# Patient Record
Sex: MALE
[De-identification: names, ages, dates, MRNs, and addresses within clinical notes are randomized per-mention and may not be internally consistent; named-entity substitution may affect disease eponyms.]

---

## 2020-08-21 ENCOUNTER — HOSPITAL ENCOUNTER (EMERGENCY)
Dept: HOSPITAL 97 - ER | Age: 30
Discharge: HOME | End: 2020-08-21
Payer: SELF-PAY

## 2020-08-21 DIAGNOSIS — F41.9: Primary | ICD-10-CM

## 2020-08-21 DIAGNOSIS — Z88.5: ICD-10-CM

## 2020-08-21 LAB
ALBUMIN SERPL BCP-MCNC: 4.2 G/DL (ref 3.4–5)
ALP SERPL-CCNC: 58 U/L (ref 45–117)
ALT SERPL W P-5'-P-CCNC: 53 U/L (ref 12–78)
AST SERPL W P-5'-P-CCNC: 22 U/L (ref 15–37)
BUN BLD-MCNC: 13 MG/DL (ref 7–18)
GLUCOSE SERPLBLD-MCNC: 111 MG/DL (ref 74–106)
HCT VFR BLD CALC: 42.3 % (ref 39.6–49)
LIPASE SERPL-CCNC: 73 U/L (ref 73–393)
LYMPHOCYTES # SPEC AUTO: 2.3 K/UL (ref 0.7–4.9)
MAGNESIUM SERPL-MCNC: 2.1 MG/DL (ref 1.8–2.4)
METHAMPHET UR QL SCN: NEGATIVE
PMV BLD: 8.1 FL (ref 7.6–11.3)
POTASSIUM SERPL-SCNC: 3.6 MMOL/L (ref 3.5–5.1)
RBC # BLD: 4.8 M/UL (ref 4.33–5.43)
THC SERPL-MCNC: NEGATIVE NG/ML
TROPONIN (EMERG DEPT USE ONLY): < 0.02 NG/ML (ref 0–0.04)

## 2020-08-21 PROCEDURE — 80076 HEPATIC FUNCTION PANEL: CPT

## 2020-08-21 PROCEDURE — 83735 ASSAY OF MAGNESIUM: CPT

## 2020-08-21 PROCEDURE — 81003 URINALYSIS AUTO W/O SCOPE: CPT

## 2020-08-21 PROCEDURE — 83690 ASSAY OF LIPASE: CPT

## 2020-08-21 PROCEDURE — 85025 COMPLETE CBC W/AUTO DIFF WBC: CPT

## 2020-08-21 PROCEDURE — 80307 DRUG TEST PRSMV CHEM ANLYZR: CPT

## 2020-08-21 PROCEDURE — 36415 COLL VENOUS BLD VENIPUNCTURE: CPT

## 2020-08-21 PROCEDURE — 84484 ASSAY OF TROPONIN QUANT: CPT

## 2020-08-21 PROCEDURE — 99284 EMERGENCY DEPT VISIT MOD MDM: CPT

## 2020-08-21 PROCEDURE — 80048 BASIC METABOLIC PNL TOTAL CA: CPT

## 2020-08-21 PROCEDURE — 71045 X-RAY EXAM CHEST 1 VIEW: CPT

## 2020-08-21 PROCEDURE — 93005 ELECTROCARDIOGRAM TRACING: CPT

## 2020-08-21 NOTE — ER
Nurse's Notes                                                                                     

 [facility]                                                                 

Name: Brandon Chaudhari                                                                              

Age: 29 yrs                                                                                       

Sex: Male                                                                                         

: 1990                                                                                   

MRN: [id number]                                                                                   

Arrival Date: 2020                                                                          

Time: 20:31                                                                                       

Account#: [id number]                                                                            

Bed 5                                                                                             

Private MD:                                                                                       

Diagnosis: Chest pain, unspecified;Anxiety                                                        

                                                                                                  

Presentation:                                                                                     

                                                                                             

20:44 Chief complaint: Patient states: "I have had anxiety in the past and it might be that,  jd3 

      but this is twice in one day now. I am having numbness in both arms, chest pressure and     

      a knot in my throat that makes it hard to breath. it happened earlier and they even         

      called the ambulance, but I got better before it arrived.". Coronavirus screen: At this     

      time, the client does not indicate any symptoms associated with coronavirus-19. Ebola       

      Screen: Patient negative for fever greater than or equal to 101.5 degrees Fahrenheit,       

      and additional compatible Ebola Virus Disease symptoms. Initial Sepsis Screen: Does the     

      patient meet any 2 criteria? No. Patient's initial sepsis screen is negative. Does the      

      patient have a suspected source of infection? No. Patient's initial sepsis screen is        

      negative. Risk Assessment: Do you want to hurt yourself or someone else? Patient            

      reports no desire to harm self or others. Onset of symptoms was 2020.            

20:44 Method Of Arrival: Ambulatory                                                           jd3 

20:44 Acuity: MARTHA 3                                                                           jd3 

                                                                                                  

Historical:                                                                                       

- Allergies:                                                                                      

20:49 Morphine;                                                                               jd3 

- Home Meds:                                                                                      

20:49 None [Active];                                                                          jd3 

- PMHx:                                                                                           

20:49 Anxiety; PVC's; ADD/ADHD;                                                               jd3 

- PSHx:                                                                                           

20:49 FELICE hand; left ankle;                                                                   jd3 

                                                                                                  

- Immunization history:: Adult Immunizations up to date.                                          

- Social history:: Smoking status: Patient/guardian denies using tobacco, Stopped _               

  months ago 1.                                                                                   

                                                                                                  

                                                                                                  

Screenin:00 Abuse screen: Denies threats or abuse. Denies injuries from another. Nutritional        rr5 

      screening: No deficits noted. Tuberculosis screening: No symptoms or risk factors           

      identified. Fall Risk IV access (20 points). Total Ricardo Fall Scale indicates No Risk       

      (0-24 pts).                                                                                 

                                                                                                  

Assessment:                                                                                       

21:00 General: Appears in no apparent distress. uncomfortable, Behavior is calm, cooperative, rr5 

      anxious.                                                                                    

21:00 Pain: Complains of pain in epigastric area Pain radiates to chest Pain currently is 6   rr5 

      out of 10 on a pain scale. Quality of pain is described as aching, Pain began               

      gradually, Is intermittent. Neuro: Level of Consciousness is awake, alert, obeys            

      commands, Oriented to person, place, time, situation. Cardiovascular: Reports chest         

      pain, Capillary refill < 3 seconds Patient's skin is warm and dry. Respiratory: Airway      

      is patent Respiratory effort is even, unlabored, Respiratory pattern is regular,            

      symmetrical. GI: Abdomen is round non-distended, Abd is soft and non tender X 4 quads.      

      Reports upper abdominal pain. : No signs and/or symptoms were reported regarding the      

      genitourinary system. EENT: No signs and/or symptoms were reported regarding the EENT       

      system. Derm: Skin is intact, is healthy with good turgor, Skin temperature is warm.        

      Musculoskeletal: Circulation, motion, and sensation intact. Capillary refill < 3            

      seconds.                                                                                    

22:10 Reassessment: Patient appears in no apparent distress at this time. Patient is alert,   rr5 

      oriented x 3, equal unlabored respirations, skin warm/dry/pink. awaiting for results.       

23:25 Reassessment: Patient appears in no apparent distress at this time. Patient is alert,   rr5 

      oriented x 3, equal unlabored respirations, skin warm/dry/pink. for review by ED            

      provider.                                                                                   

23:53 Reassessment: Patient appears in no apparent distress at this time. Patient is alert,   rr5 

      oriented x 3, equal unlabored respirations, skin warm/dry/pink. discharge instruction       

      given and explained without complaints made.                                                

                                                                                                  

Vital Signs:                                                                                      

20:46  / 70; Pulse 55; Resp 19 S; Temp 98.6(O); Pulse Ox 100% on R/A; Weight 79.38 kg   jd3 

      (R); Height 6 ft. 1 in. (185.42 cm) (R); Pain 5/10;                                         

21:55  / 75; Pulse 52; Resp 17; Pulse Ox 98% ;                                          rr5 

22:30  / 63; Pulse 55; Resp 19; Pulse Ox 100% ;                                         rr5 

23:25  / 69; Pulse 53; Resp 14; Pulse Ox 98% ;                                          rr5 

23:52  / 70; Pulse 55; Resp 16; Pulse Ox 99% ;                                          rr5 

20:46 Body Mass Index 23.09 (79.38 kg, 185.42 cm)                                             jd3 

                                                                                                  

ED Course:                                                                                        

20:31 Patient arrived in ED.                                                                  bp1 

20:42 Denver Jefferson MD is Attending Physician.                                             mh7 

20:46 Triage completed.                                                                       jd3 

20:47 Arm band placed on.                                                                     jd3 

20:57 Wander Bekcham, RN is Primary Nurse.                                                    rr5 

21:00 Patient has correct armband on for positive identification. Bed in low position. Call   rr5 

      light in reach. Cardiac monitor on. Pulse ox on. NIBP on.                                   

21:05 EKG done, by ED staff, reviewed by Denver Jefferson MD.                                   rr5 

21:12 Inserted saline lock: 20 gauge in right forearm, using aseptic technique. Blood         rr5 

      collected.                                                                                  

22:43 XRAY Chest (1 view) In Process Unspecified.                                             EDMS

23:53 No provider procedures requiring assistance completed. IV discontinued, intact,         rr5 

      bleeding controlled, No redness/swelling at site. Pressure dressing applied.                

                                                                                                  

Administered Medications:                                                                         

No medications were administered                                                                  

                                                                                                  

                                                                                                  

Outcome:                                                                                          

23:42 Discharge ordered by MD.                                                                7 

23:53 Discharged to home ambulatory.                                                          rr5 

23:53 Condition: stable                                                                           

23:53 Discharge instructions given to patient, Instructed on discharge instructions, follow       

      up and referral plans. Demonstrated understanding of instructions, follow-up care.          

23:55 Patient left the ED.                                                                    rr5 

                                                                                                  

Signatures:                                                                                       

Dispatcher [employer]                           EDMS                                                 

Jewel Kilgore RN RN   jd3                                                  

Wander Beckham, RN                      RN   rr5                                                  

Sumi Falk                           bp1                                                  

Denver Jefferson MD MD   [facility]                                                  

                                                                                                  

**************************************************************************************************

## 2020-08-22 NOTE — RAD REPORT
EXAM DESCRIPTION:  RAD - Chest Single View - 8/21/2020 10:43 pm

 

CLINICAL HISTORY:  29 years   Male   CHEST PAIN

 

TECHNIQUE:  One view of the chest.

 

COMPARISON:  No prior exams provided for comparison.

 

FINDINGS:  The lungs are clear without focal consolidation, effusion, or pneumothorax. The cardiomedi
astinal silhouette and central pulmonary vasculature are normal. No acute osseous abnormalities.

 

IMPRESSION:  No acute cardiopulmonary abnormalities.

 

Electronically signed by:   Carrol Hardin MD   8/21/2020 10:53 PM CDT Workstation: [id number]

 

 

Due to temporary technical issues with the PACS/Fluency reporting system, reports are being signed by
 the in house radiologist without review as a courtesy to ensure prompt reporting. The interpreting r
adiologist is fully responsible for the content of the report.